# Patient Record
Sex: MALE | Race: WHITE | Employment: UNEMPLOYED | URBAN - METROPOLITAN AREA
[De-identification: names, ages, dates, MRNs, and addresses within clinical notes are randomized per-mention and may not be internally consistent; named-entity substitution may affect disease eponyms.]

---

## 2023-05-23 ENCOUNTER — HOSPITAL ENCOUNTER (EMERGENCY)
Facility: HOSPITAL | Age: 43
Discharge: HOME | End: 2023-05-24
Attending: EMERGENCY MEDICINE
Payer: COMMERCIAL

## 2023-05-23 DIAGNOSIS — E86.0 SEVERE DEHYDRATION: ICD-10-CM

## 2023-05-23 DIAGNOSIS — F12.10 TETRAHYDROCANNABINOL (THC) USE DISORDER, MILD, ABUSE: ICD-10-CM

## 2023-05-23 DIAGNOSIS — R53.1 GENERALIZED WEAKNESS: ICD-10-CM

## 2023-05-23 DIAGNOSIS — T50.901A ACCIDENTAL DRUG OVERDOSE, INITIAL ENCOUNTER: Primary | ICD-10-CM

## 2023-05-23 LAB
GLUCOSE BLD STRIP.AUTO-MCNC: 159 MG/DL (ref 65–117)
SERVICE CMNT-IMP: ABNORMAL

## 2023-05-23 PROCEDURE — 82962 GLUCOSE BLOOD TEST: CPT

## 2023-05-23 PROCEDURE — 99284 EMERGENCY DEPT VISIT MOD MDM: CPT

## 2023-05-23 RX ORDER — 0.9 % SODIUM CHLORIDE 0.9 %
1000 INTRAVENOUS SOLUTION INTRAVENOUS ONCE
Status: COMPLETED | OUTPATIENT
Start: 2023-05-23 | End: 2023-05-24

## 2023-05-23 ASSESSMENT — PAIN DESCRIPTION - DESCRIPTORS: DESCRIPTORS: ACHING

## 2023-05-23 ASSESSMENT — PAIN SCALES - GENERAL: PAINLEVEL_OUTOF10: 6

## 2023-05-23 ASSESSMENT — PAIN - FUNCTIONAL ASSESSMENT: PAIN_FUNCTIONAL_ASSESSMENT: 0-10

## 2023-05-23 ASSESSMENT — PAIN DESCRIPTION - LOCATION: LOCATION: BACK

## 2023-05-23 ASSESSMENT — PAIN DESCRIPTION - ORIENTATION: ORIENTATION: LOWER

## 2023-05-24 VITALS
HEART RATE: 60 BPM | TEMPERATURE: 98.3 F | BODY MASS INDEX: 37.3 KG/M2 | WEIGHT: 300 LBS | OXYGEN SATURATION: 95 % | DIASTOLIC BLOOD PRESSURE: 61 MMHG | RESPIRATION RATE: 16 BRPM | SYSTOLIC BLOOD PRESSURE: 90 MMHG | HEIGHT: 75 IN

## 2023-05-24 LAB
ALBUMIN SERPL-MCNC: 3.7 G/DL (ref 3.5–5)
ALBUMIN/GLOB SERPL: 1.2 (ref 1.1–2.2)
ALP SERPL-CCNC: 91 U/L (ref 45–117)
ALT SERPL-CCNC: 26 U/L (ref 12–78)
AMPHET UR QL SCN: NEGATIVE
ANION GAP SERPL CALC-SCNC: 9 MMOL/L (ref 5–15)
AST SERPL-CCNC: 21 U/L (ref 15–37)
BARBITURATES UR QL SCN: NEGATIVE
BASOPHILS # BLD: 0 K/UL (ref 0–0.1)
BASOPHILS NFR BLD: 1 % (ref 0–1)
BENZODIAZ UR QL: NEGATIVE
BILIRUB SERPL-MCNC: 0.6 MG/DL (ref 0.2–1)
BUN SERPL-MCNC: 14 MG/DL (ref 6–20)
BUN/CREAT SERPL: 12 (ref 12–20)
CALCIUM SERPL-MCNC: 8.1 MG/DL (ref 8.5–10.1)
CANNABINOIDS UR QL SCN: POSITIVE
CHLORIDE SERPL-SCNC: 105 MMOL/L (ref 97–108)
CK SERPL-CCNC: 273 U/L (ref 39–308)
CO2 SERPL-SCNC: 27 MMOL/L (ref 21–32)
COCAINE UR QL SCN: NEGATIVE
CREAT SERPL-MCNC: 1.18 MG/DL (ref 0.7–1.3)
DIFFERENTIAL METHOD BLD: ABNORMAL
EOSINOPHIL # BLD: 0.2 K/UL (ref 0–0.4)
EOSINOPHIL NFR BLD: 3 % (ref 0–7)
ERYTHROCYTE [DISTWIDTH] IN BLOOD BY AUTOMATED COUNT: 12.8 % (ref 11.5–14.5)
ETHANOL SERPL-MCNC: <10 MG/DL (ref 0–0.08)
GLOBULIN SER CALC-MCNC: 3.1 G/DL (ref 2–4)
GLUCOSE SERPL-MCNC: 165 MG/DL (ref 65–100)
HCT VFR BLD AUTO: 39.3 % (ref 36.6–50.3)
HGB BLD-MCNC: 13.3 G/DL (ref 12.1–17)
IMM GRANULOCYTES # BLD AUTO: 0 K/UL (ref 0–0.04)
IMM GRANULOCYTES NFR BLD AUTO: 0 % (ref 0–0.5)
LYMPHOCYTES # BLD: 1.1 K/UL (ref 0.8–3.5)
LYMPHOCYTES NFR BLD: 15 % (ref 12–49)
Lab: ABNORMAL
MCH RBC QN AUTO: 30.4 PG (ref 26–34)
MCHC RBC AUTO-ENTMCNC: 33.8 G/DL (ref 30–36.5)
MCV RBC AUTO: 89.7 FL (ref 80–99)
METHADONE UR QL: NEGATIVE
MONOCYTES # BLD: 0.4 K/UL (ref 0–1)
MONOCYTES NFR BLD: 6 % (ref 5–13)
NEUTS SEG # BLD: 6 K/UL (ref 1.8–8)
NEUTS SEG NFR BLD: 75 % (ref 32–75)
NRBC # BLD: 0 K/UL (ref 0–0.01)
NRBC BLD-RTO: 0 PER 100 WBC
OPIATES UR QL: NEGATIVE
PCP UR QL: NEGATIVE
PLATELET # BLD AUTO: 214 K/UL (ref 150–400)
PMV BLD AUTO: 8.8 FL (ref 8.9–12.9)
POTASSIUM SERPL-SCNC: 3.9 MMOL/L (ref 3.5–5.1)
PROT SERPL-MCNC: 6.8 G/DL (ref 6.4–8.2)
RBC # BLD AUTO: 4.38 M/UL (ref 4.1–5.7)
SODIUM SERPL-SCNC: 141 MMOL/L (ref 136–145)
WBC # BLD AUTO: 7.8 K/UL (ref 4.1–11.1)

## 2023-05-24 PROCEDURE — 82550 ASSAY OF CK (CPK): CPT

## 2023-05-24 PROCEDURE — 96360 HYDRATION IV INFUSION INIT: CPT

## 2023-05-24 PROCEDURE — 80053 COMPREHEN METABOLIC PANEL: CPT

## 2023-05-24 PROCEDURE — 96361 HYDRATE IV INFUSION ADD-ON: CPT

## 2023-05-24 PROCEDURE — 93005 ELECTROCARDIOGRAM TRACING: CPT | Performed by: EMERGENCY MEDICINE

## 2023-05-24 PROCEDURE — 2580000003 HC RX 258: Performed by: EMERGENCY MEDICINE

## 2023-05-24 PROCEDURE — 80307 DRUG TEST PRSMV CHEM ANLYZR: CPT

## 2023-05-24 PROCEDURE — 82077 ASSAY SPEC XCP UR&BREATH IA: CPT

## 2023-05-24 PROCEDURE — 85025 COMPLETE CBC W/AUTO DIFF WBC: CPT

## 2023-05-24 RX ORDER — SODIUM CHLORIDE, SODIUM LACTATE, POTASSIUM CHLORIDE, AND CALCIUM CHLORIDE .6; .31; .03; .02 G/100ML; G/100ML; G/100ML; G/100ML
1000 INJECTION, SOLUTION INTRAVENOUS
Status: COMPLETED | OUTPATIENT
Start: 2023-05-24 | End: 2023-05-24

## 2023-05-24 RX ADMIN — SODIUM CHLORIDE, POTASSIUM CHLORIDE, SODIUM LACTATE AND CALCIUM CHLORIDE 1000 ML: 600; 310; 30; 20 INJECTION, SOLUTION INTRAVENOUS at 02:13

## 2023-05-24 RX ADMIN — SODIUM CHLORIDE 1000 ML: 9 INJECTION, SOLUTION INTRAVENOUS at 00:10

## 2023-05-24 ASSESSMENT — ENCOUNTER SYMPTOMS
NAUSEA: 0
COUGH: 0
ABDOMINAL PAIN: 0
SORE THROAT: 0
SHORTNESS OF BREATH: 0
RHINORRHEA: 0
DIARRHEA: 0
VOMITING: 0
EYE PAIN: 0

## 2023-05-24 ASSESSMENT — LIFESTYLE VARIABLES
HOW MANY STANDARD DRINKS CONTAINING ALCOHOL DO YOU HAVE ON A TYPICAL DAY: PATIENT DOES NOT DRINK
HOW OFTEN DO YOU HAVE A DRINK CONTAINING ALCOHOL: NEVER

## 2023-05-24 NOTE — ED PROVIDER NOTES
Compromise, and CNS Decompensation  MANAGEMENT- Bedside Assessment and Supervision of Care  INTERPRETATION -  Xrays, ECG, Blood Pressure, and Cardiac Output Measures   INTERVENTIONS - hemodynamic mngmt, vascular control, and Metobolic interventions, multiple fluid boluses for fluid resuscitation, hypotension, severe dehydration. Frequent neurovascular reassessments and monitoring. CASE REVIEW - Nursing, Family, and EMS  TREATMENT RESPONSE -Improved  PERFORMED BY - Self    NOTES   :  I personally spent 55 minutes of critical care time with this patient. This is time spent at this critically ill patient's bedside actively involved in patient care as well as the coordination of care and discussions with the patient's family. This includes time involved in ordering and reviewing of laboratory studies, pulse oximetry, re-evaluation of patient's condition, examination of patient, evaluation of patient's response to treatment, ordering and performing treatments and interventions, review of old charts, consultations with specialist, discussions with family regarding pertinent collateral history and plan of care, bedside attention and documentation. During this entire length of time I was immediately available to the patient. This does not include time spent on separately reported billable procedures. Critical Care: The reason for providing this level of medical care for this critically-ill patient was due to a critical illness that impaired one or more vital organ systems, such that there was a high probability of imminent or life-threatening deterioration in the patient's condition. This care involved the highest level of preparedness to intervene urgently.  This care involved high complexity decision making to assess, manipulate, and support vital system functions, to treat this degree of vital organ system failure, and to prevent further life threatening deterioration of the patients condition requiring frequent

## 2023-05-24 NOTE — DISCHARGE INSTRUCTIONS
doctor, nurse practitioner, or physician assistant for ongoing care. If your symptoms become worse or you do not improve as expected and you are unable to reach your usual health care provider, you should return to the Emergency Department. We are available 24 hours a day. Please take your discharge instructions with you when you go to your follow-up appointment. If a prescription has been provided, please have it filled as soon as possible to prevent a delay in treatment. Read the entire medication instruction sheet provided to you by the pharmacy. If you have any questions or reservations about taking the medication due to side effects or interactions with other medications, please call your primary care physician or contact the ER to speak with the charge nurse. Please make an appointment with your family doctor or the physician you were referred to for follow-up of this visit as instructed on your discharge paperwork. Return to the ER if you are unable to be seen or if you are unable to be seen in a timely manner. Should you experience abdominal pain lasting greater than 6 hours, chest pain, difficulty breathing, fever/chills, numbness/tingling, skin changes or other symptoms that concern you, return to the ED sooner. If you feel worse over the next 24 hours, please return to the ED. We are available 24 hours a day. Thank you for trusting us with your care!

## 2023-05-24 NOTE — ED TRIAGE NOTES
Pt presents to ED via EMS, with c/o feeling \"heavy\" after ingesting Delta 8 at 6pm  Per EMS, pt's roommate called EMS  Pt denies alcohol use today, denies any other drug use. Per EMS, pt takes Lexapro but has not had today.

## 2023-05-24 NOTE — ED NOTES
Emergency Department Nursing Plan of Care      The Nursing Plan of Care is developed from the Nursing assessment and Emergency Department Attending provider initial evaluation. The plan of care may be reviewed in the ED Provider note.     The Plan of Care was developed with the following considerations:  Patient / Family readiness to learn indicated by:verbalized understanding  Persons(s) to be included in education: patient  Barriers to Learning/Limitations:None    Signed    Alexandru Ralph RN    5/24/2023   1:02 AM       Alexandru Ralph RN  05/24/23 6591

## 2023-05-24 NOTE — ED NOTES
Discharge instructions were given to the patient by Eileen Rouse. The patient left the Emergency Department ambulatory, alert and oriented and in no acute distress with 0 prescriptions. The patient was encouraged to call or return to the ED for worsening issues or problems and was encouraged to schedule a follow up appointment for continuing care. The patient verbalized understanding of discharge instructions and prescriptions, all questions were answered. The patient has no further concerns at this time.         [facility]  05/24/23 0717

## 2023-05-25 LAB
EKG ATRIAL RATE: 76 BPM
EKG DIAGNOSIS: NORMAL
EKG P AXIS: 39 DEGREES
EKG P-R INTERVAL: 162 MS
EKG Q-T INTERVAL: 416 MS
EKG QRS DURATION: 98 MS
EKG QTC CALCULATION (BAZETT): 468 MS
EKG R AXIS: 6 DEGREES
EKG T AXIS: 5 DEGREES
EKG VENTRICULAR RATE: 76 BPM

## 2023-05-25 PROCEDURE — 93010 ELECTROCARDIOGRAM REPORT: CPT | Performed by: SPECIALIST
